# Patient Record
Sex: FEMALE
[De-identification: names, ages, dates, MRNs, and addresses within clinical notes are randomized per-mention and may not be internally consistent; named-entity substitution may affect disease eponyms.]

---

## 2019-06-24 PROBLEM — Z00.00 ENCOUNTER FOR PREVENTIVE HEALTH EXAMINATION: Status: ACTIVE | Noted: 2019-06-24

## 2019-07-10 ENCOUNTER — APPOINTMENT (OUTPATIENT)
Dept: OTOLARYNGOLOGY | Facility: CLINIC | Age: 84
End: 2019-07-10
Payer: MEDICARE

## 2019-07-10 VITALS
DIASTOLIC BLOOD PRESSURE: 82 MMHG | HEIGHT: 59 IN | WEIGHT: 80 LBS | SYSTOLIC BLOOD PRESSURE: 157 MMHG | HEART RATE: 72 BPM | BODY MASS INDEX: 16.13 KG/M2

## 2019-07-10 DIAGNOSIS — Z87.39 PERSONAL HISTORY OF OTHER DISEASES OF THE MUSCULOSKELETAL SYSTEM AND CONNECTIVE TISSUE: ICD-10-CM

## 2019-07-10 DIAGNOSIS — Z86.79 PERSONAL HISTORY OF OTHER DISEASES OF THE CIRCULATORY SYSTEM: ICD-10-CM

## 2019-07-10 DIAGNOSIS — Z82.49 FAMILY HISTORY OF ISCHEMIC HEART DISEASE AND OTHER DISEASES OF THE CIRCULATORY SYSTEM: ICD-10-CM

## 2019-07-10 DIAGNOSIS — Z82.3 FAMILY HISTORY OF STROKE: ICD-10-CM

## 2019-07-10 PROCEDURE — 92557 COMPREHENSIVE HEARING TEST: CPT

## 2019-07-10 PROCEDURE — 92567 TYMPANOMETRY: CPT

## 2019-07-10 PROCEDURE — 99213 OFFICE O/P EST LOW 20 MIN: CPT | Mod: 25

## 2019-07-10 PROCEDURE — 31231 NASAL ENDOSCOPY DX: CPT

## 2019-07-10 NOTE — REVIEW OF SYSTEMS
[Post Nasal Drip] : post nasal drip [Hearing Loss] : hearing loss [Nasal Congestion] : nasal congestion [Feeling Tired] : feeling tired [Eyesight Problems] : eyesight problems [Recent Weight Loss (___ Lbs)] : recent [unfilled] ~Ulb weight loss [Constipation] : constipation [Diarrhea] : diarrhea [Heartburn] : heartburn [Dizziness] : dizziness [Feelings Of Weakness] : feelings of weakness [Easy Bruising] : a tendency for easy bruising [Negative] : Heme/Lymph

## 2019-07-10 NOTE — ASSESSMENT
[FreeTextEntry1] : It was my impression that she has the rhinitis that is improving with Nasacort. She also has nasal valve collapse and I suggested tryingbreathe rite strips.  I indicated she would have to be careful about the tape, however.\par I felt she could continue with Nasacort and Nasonex.\par Her throat looks better although there was thick dried mucous and I recommend continuing with hydration and topical moisturizing to the nose at night.\par The hearing test was reviewed with the patient.  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  I felt that the patient would benefit from hearing aids and this was  recommended

## 2019-07-10 NOTE — CONSULT LETTER
[FreeTextEntry2] : LM MERINO\par  [FreeTextEntry1] : \par \par Dear  Dr. LM MERINO,\par \par I had the pleasure of seeing your patient today.  \par Please see my note below.\par \par \par Thank you very much for allowing me to participate in the care of your patient.\par \par Sincerely,\par \par \par Russel Cavazos\par \par \par Evan Cavazos MD\par [facility]\par [facility]\par  [facility]\par \par

## 2019-07-10 NOTE — REASON FOR VISIT
[Subsequent Evaluation] : a subsequent evaluation for [FreeTextEntry2] : Nasal Congestion and throat irritation

## 2019-07-10 NOTE — HISTORY OF PRESENT ILLNESS
[de-identified] : LOIS RANDALL was seen on July 10. I had last seen her year ago. Since I had seen her she had suffered a fall with fractures. She developed an upper respiratory tract infection while in rehabilitation and went home on June 5. She was complaining of nasal congestion and throat discomfort. This was held with Nasacort and mucinex.  This is improved but not resolved. She has a long history of throat discomfort. She did not find improvement with gabapentin. However, using alkaline water helped her symptoms dramatically. Additionally, she believes that she needs hearing aids. Certainly her family thinks so.The patient had no other ear nose or throat complaints at this visit.

## 2019-08-12 ENCOUNTER — APPOINTMENT (OUTPATIENT)
Dept: OTOLARYNGOLOGY | Facility: CLINIC | Age: 84
End: 2019-08-12
Payer: MEDICARE

## 2019-08-12 PROCEDURE — V5010 ASSESSMENT FOR HEARING AID: CPT | Mod: NC

## 2019-08-16 ENCOUNTER — APPOINTMENT (OUTPATIENT)
Dept: OTOLARYNGOLOGY | Facility: CLINIC | Age: 84
End: 2019-08-16

## 2019-09-27 ENCOUNTER — APPOINTMENT (OUTPATIENT)
Dept: OTOLARYNGOLOGY | Facility: CLINIC | Age: 84
End: 2019-09-27
Payer: MEDICARE

## 2019-09-27 VITALS
DIASTOLIC BLOOD PRESSURE: 72 MMHG | BODY MASS INDEX: 16.13 KG/M2 | HEIGHT: 59 IN | SYSTOLIC BLOOD PRESSURE: 127 MMHG | WEIGHT: 80 LBS | HEART RATE: 80 BPM

## 2019-09-27 PROCEDURE — 31231 NASAL ENDOSCOPY DX: CPT

## 2019-09-27 PROCEDURE — 69210 REMOVE IMPACTED EAR WAX UNI: CPT

## 2019-09-27 PROCEDURE — 99213 OFFICE O/P EST LOW 20 MIN: CPT | Mod: 25

## 2019-09-27 RX ORDER — AMLODIPINE BESYLATE 5 MG/1
TABLET ORAL
Refills: 0 | Status: ACTIVE | COMMUNITY

## 2019-09-27 RX ORDER — CLONAZEPAM 2 MG/1
TABLET ORAL
Refills: 0 | Status: ACTIVE | COMMUNITY

## 2019-09-27 RX ORDER — FEXOFENADINE HYDROCHLORIDE 180 MG/1
TABLET ORAL
Refills: 0 | Status: ACTIVE | COMMUNITY

## 2019-09-27 RX ORDER — ATENOLOL 50 MG/1
TABLET ORAL
Refills: 0 | Status: ACTIVE | COMMUNITY

## 2019-09-27 NOTE — PHYSICAL EXAM
[FreeTextEntry1] : \par The patient was alert and oriented and in no distress.\par Voice was clear.\par She was using a walker and comes in with an aid.\par \par Face:\par The patient had no facial asymmetry or mass.\par The skin was unremarkable.\par \par Eyes:\par The pupils were equal round and reactive to light and accommodation.\par There was no significant nystagmus or disconjugate gaze noted.\par \par Nose: \par The external nose had no significant deformity.  There was no facial tenderness.  On anterior rhinoscopy, the nasal mucosa was clear.  The anterior septum was midline.  There were no visualized polyps purulence  or masses.\par \par Oral cavity:\par The oral mucosa was normal.\par The oral and base of tongue were clear and without mass.\par The gingival and buccal mucosa were moist and without lesions.\par The palate moved well.\par There was no cleft to the palate.\par There appeared to be good salivary flow.  \par There was no pus, erythema or mass in the oral cavity.   Ears:\par  Procedure note:\par  Removal of Cerumen Impactions, both ears:  75012-00\par Both external ears were normal.\par There were symptomatic cerumen impactions in both ears.  These were cleared microscopically without trauma using both suction and curettes.  After clearing,  both ear canals were clear both eardrums were intact and mobile.  \par The tympanic membranes were intact and normal.\par \par Neck: \par The neck was symmetrical.\par The parotid and submandibular glands were normal without masses.\par The trachea was midline and there was no unusual crepitus.\par The thyroid was smooth and nontender and no masses were palpated.\par There was no significant cervical adenopathy.\par \par \par Neuro:\par Neurologically, the patient was awake, alert, and oriented to person, place and time. There were no obvious focal neurologic abnormalities.  Cranial nerves II through XII were grossly intact.\par \par \par TMJ:\par The temporomandibular joints were nontender.\par There was no abnormal crepitus and no significant malocclusion\par  [de-identified] : Nasal endoscopy: \par \par Procedure Note:\par \par Nasal endoscopy was done with topical anesthesia and a fiberoptic endoscope.\par Indication: Nasal congestion, rule out sinusitis.\par Procedure: The nasal cavity was anesthetized with topical Afrin and Pontocaine. An  endoscope was used and inserted into the nasal cavity. \par Endocoscopy was performed to inspect the interior of the nasal cavity, the nasal septum,  the middle and superior meati, the inferior, middle and superior turbinates, and the spheno-ethmoidal  recesses, the nasopharynx and eustachian tube orifices bilaterally\par  This showed that the nasal mucosa was slightly boggy.  There was a moderate S-shaped septal deflection.  However, the middle meati were open.  There were no polyps, purulence or masses.  The eustachian tube orifices were clear.  The nasopharynx was benign and without mass.  The inferior and middle turbinates were slightly boggy, the superior meati were normal and the sphenoethmoidal recesses were without evidence of disease.\par The nasal mucosa was somewhat beefy with a moderate right septal deflection without polyps, purulence or masses.\par She had nasal valve stenosis but did not have much improvement in her airway with Nayan maneuver\par \par \par

## 2019-09-27 NOTE — HISTORY OF PRESENT ILLNESS
[de-identified] :  LOIS RANDALL Was seen in followup on September 27. I had last seen her in July .  She complains of facial pressure postnasal drip of thick phlegm when she is recumbent- for a month. I had last seen her in July she was complaining of nasal congestion and throat discomfort that reponded toNasacort and mucinex. This was improved but not resolved and now this does not seem to be helping.. She has a long history of throat discomfort. She did not find improvement with gabapentin. Previously, using alkaline water helped her symptoms dramatically.\par She has a persistent disequilibrium and is going to [place] for vestibular rehabilitation\par She has the hearing losses but has not yet gotten her hearing aids.\par The patient had no other ear nose or throat complaints at this visit.\par The patient had no other ear nose or throat complaints at this visit.\par

## 2019-09-27 NOTE — ASSESSMENT
[FreeTextEntry1] : It was my impression that she has the rhinitis that is improving with Nasacort. \par However, she still complains of thick mucus and nasal congestion when recumbent.\par She has elevated the head of the bed\par She does have some nasal valve collapse but she was not helped with breech right strips\par \par Her throat looks better although there was thick dried mucous and I recommend continuing with hydration and topical moisturizing to the nose at night.  I suggested to 1200 mg of guaifenesin at bedtime and using 2 sprays of the Nasacort in each nostril at bedtime.\par I suggested continuing with her vestibular therapy, which she had planned to stop\par She had the cerumen impactions were cleared and I recommended topical moisturizing\par I suggested going ahead for her hearing aid evaluation\par I recommended a humidifier now for the bedroom at night and would like to see her back in followup in 2 months or earlier if not improving\par she finds her throat symptoms are finally much improved with alkaline water

## 2019-09-27 NOTE — REVIEW OF SYSTEMS
[Vertigo] : vertigo [Negative] : Heme/Lymph [Post Nasal Drip] : post nasal drip [Hearing Loss] : hearing loss [Nasal Congestion] : nasal congestion [Feeling Tired] : feeling tired [Recent Weight Loss (___ Lbs)] : recent [unfilled] ~Ulb weight loss [Eyesight Problems] : eyesight problems [Constipation] : constipation [Diarrhea] : diarrhea [Heartburn] : heartburn [Dizziness] : dizziness [Feelings Of Weakness] : feelings of weakness [Easy Bruising] : a tendency for easy bruising

## 2019-09-27 NOTE — CONSULT LETTER
[FreeTextEntry2] : LM MERINO\par  [FreeTextEntry1] : \par \par Dear  Dr. LM MERINO,\par \par I had the pleasure of seeing your patient today.  \par Please see my note below.\par \par \par Thank you very much for allowing me to participate in the care of your patient.\par \par Sincerely,\par \par \par Russel Cavazos\par \par \par Evan Cavazos MD\par [facility]\par [facility]\par  [facility]\par \par

## 2019-11-20 ENCOUNTER — APPOINTMENT (OUTPATIENT)
Dept: OTOLARYNGOLOGY | Facility: CLINIC | Age: 84
End: 2019-11-20

## 2019-12-09 ENCOUNTER — APPOINTMENT (OUTPATIENT)
Dept: OTOLARYNGOLOGY | Facility: CLINIC | Age: 84
End: 2019-12-09
Payer: MEDICARE

## 2019-12-09 VITALS
BODY MASS INDEX: 16.13 KG/M2 | WEIGHT: 80 LBS | DIASTOLIC BLOOD PRESSURE: 85 MMHG | HEART RATE: 85 BPM | SYSTOLIC BLOOD PRESSURE: 131 MMHG | HEIGHT: 59 IN

## 2019-12-09 PROCEDURE — 31231 NASAL ENDOSCOPY DX: CPT

## 2019-12-09 PROCEDURE — 99213 OFFICE O/P EST LOW 20 MIN: CPT | Mod: 25

## 2019-12-09 RX ORDER — IPRATROPIUM BROMIDE 21 UG/1
0.03 SPRAY NASAL 3 TIMES DAILY
Qty: 1 | Refills: 5 | Status: ACTIVE | COMMUNITY
Start: 2019-12-09 | End: 1900-01-01

## 2019-12-09 NOTE — ASSESSMENT
[FreeTextEntry1] : It was my impression that she has the complaints of a low-grade fever for 2 weeks. I did not see an etiology for this and suggested following up with her internist. She has a chronic rhinopharyngitis and increased postnasal drip at night. She probably has some vasomotor component and I suggested trial of Atrovent at night only.\par I suggested following up for her hearing aides as well. Vestibular therapy has helped her imbalance.\par I did take a throat culture and would treat if so indicated.

## 2019-12-09 NOTE — CONSULT LETTER
[FreeTextEntry2] : LM MERINO\par  [FreeTextEntry1] : \par \par Dear  Dr. LM MERINO,\par \par I had the pleasure of seeing your patient today.  \par Please see my note below.\par \par \par Thank you very much for allowing me to participate in the care of your patient.\par \par Sincerely,\par \par \par Russel Cavazos\par \par \par Evan Cavazos MD\par [facility]\par [facility]\par  [facility]\par \par  [FreeTextEntry3] : Russel\par \par Evan Cavazos MD\par [facility]\par [facility]\par  [facility]\par \par

## 2019-12-09 NOTE — HISTORY OF PRESENT ILLNESS
[de-identified] : LOIS RANDALL is a 88 year old female who comes in complaining of Running a low-grade fever for about 2 weeks. She says it isabout 100.5 and comes down to 99 5 with Tylenol. She was thought to have a year a urinary tract infection but apparently her culture was negative.She does not feel acutely ill and has no new complaints other than feeling under the weather.  She is complaining of an increased post nasal drip at night and has had some bloody discharge at times from the right nasal cavity.\par I had seen her multiple times for the nasal congestion and throat discomfort.She did not have much improvement with gabapentin but alkaline water helped her throat. Her disequilibrium is better after going for vestibular rehabilitation and she has a hearing losses and has still not gotten her aids yet.The patient had no other ear nose or throat complaints at this visit.

## 2019-12-09 NOTE — PHYSICAL EXAM
[FreeTextEntry1] : General:\par The patient was alert and oriented and in no distress.\par Voice was clear. She looks as if she did not feel well\par she was using a walker for ambulation\par Ears:\par The external ears were normal without deformity.\par The ear canals were clear.\par The tympanic membranes were intact and normal.\par \par Oral cavity:\par The oral mucosa was normal.\par The oral and base of tongue were clear and without mass.\par The gingival and buccal mucosa were moist and without lesions.\par The palate moved well.\par There was no cleft to the palate.\par There appeared to be good salivary flow.  \par There was no pus, erythema or mass in the oral cavity.\par \par Neck: \par The neck was symmetrical.\par The parotid and submandibular glands were normal without masses.\par The trachea was midline and there was no unusual crepitus.\par The thyroid was smooth and nontender and no masses were palpated.\par There was no significant cervical adenopathy.\par \par Neuro:\par Neurologically, the patient was awake, alert, and oriented to person, place and time. There were no obvious focal neurologic abnormalities.  Cranial nerves II through XII were grossly intact.\par  [de-identified] : Nasal endoscopy: \par CPT 88334\par Procedure Note:\par \par Nasal endoscopy was done with topical anesthesia of Pontocaine and Afrin and a      nasal endoscope.\par Indication: Nasal congestion, rule out sinusitis, fever, epistaxis.\par Procedure: The nasal cavity was anesthetized with topical Afrin and Pontocaine. An  endoscope was used and inserted into the nasal cavity.\par Attention was first paid to the anterior nasal cavity.\par Endocoscopy was performed to inspect the interior of the nasal cavity, the nasal septum,  the middle and superior meati, the inferior, middle and superior turbinates, and the spheno-ethmoidal  recesses, the nasopharynx and eustachian tube orifices bilaterally. \par All findings were normal except:\par She has the nasal valve insufficiency and flap was re re nasal mucosa with a healing eschar on the right mid septum without polyps, purulence or masses. The middle meati were patent and the nasopharynx was benign.\par \par Flexible fiberoptic laryngoscopy: CPT 56466\par Indications: Dysphagia\par Procedure note:\par \par Flexible fiberoptic laryngoscopy was performed because of dysphagia and because of the patient's inability to tolerate adequate mirror examination.\par \par The nasal cavity was anesthetized with Pontocaine and Afrin.\par The flexible endoscope was placed into the patient's nasal cavity.\par The nasopharynx was without masses.\par The oropharynx, vallecula and base of tongue had no masses.\par The epiglottis, aryepiglottic folds and false vocal cords were normal.\par The pyriform sinuses were without mucosal lesions or pooling of secretions.  \par The laryngeal ventricles were without lesions.\par The visualized subglottis was without mass.\par The lateral and posterior pharyngeal walls were clear and symmetrical.\par The vocal folds were clear and mobile; they abducted and adducted normally.\par There was no interarytenoid mass or fullness.\par She had some pooling of secretions postcricoid\par

## 2019-12-12 LAB — BACTERIA THROAT CULT: NORMAL

## 2019-12-19 ENCOUNTER — APPOINTMENT (OUTPATIENT)
Dept: OTOLARYNGOLOGY | Facility: CLINIC | Age: 84
End: 2019-12-19

## 2019-12-27 ENCOUNTER — APPOINTMENT (OUTPATIENT)
Dept: OTOLARYNGOLOGY | Facility: CLINIC | Age: 84
End: 2019-12-27
Payer: MEDICARE

## 2019-12-27 VITALS
DIASTOLIC BLOOD PRESSURE: 79 MMHG | BODY MASS INDEX: 16.13 KG/M2 | HEART RATE: 75 BPM | SYSTOLIC BLOOD PRESSURE: 127 MMHG | WEIGHT: 80 LBS | HEIGHT: 59 IN

## 2019-12-27 PROCEDURE — 99213 OFFICE O/P EST LOW 20 MIN: CPT | Mod: 25

## 2019-12-27 PROCEDURE — 31231 NASAL ENDOSCOPY DX: CPT

## 2019-12-27 NOTE — HISTORY OF PRESENT ILLNESS
[de-identified] : LOIS RANDALL is a 88 year woman with a history of recent low grade fever and chills. She has some facial discomfort. Her nose is stuffy and she has some bleeding.She is not seeing much discharge.

## 2019-12-27 NOTE — REVIEW OF SYSTEMS
[Nasal Congestion] : nasal congestion [Throat Pain] : throat pain [Patient Intake Form Reviewed] : Patient intake form was reviewed

## 2019-12-27 NOTE — ASSESSMENT
[FreeTextEntry1] : LOIS RANDALL malaise, low grade fever and chills. I will send her for sinus CT to rule out sinus infection which is unlikely to cause her symptoms.

## 2019-12-27 NOTE — CONSULT LETTER
[Dear  ___] : Dear  [unfilled], [Please see my note below.] : Please see my note below. [Consult Letter:] : I had the pleasure of evaluating your patient, [unfilled]. [Consult Closing:] : Thank you very much for allowing me to participate in the care of this patient.  If you have any questions, please do not hesitate to contact me. [Sincerely,] : Sincerely, [FreeTextEntry3] : Shahzad Deras MD\par

## 2020-01-30 ENCOUNTER — APPOINTMENT (OUTPATIENT)
Dept: OTOLARYNGOLOGY | Facility: CLINIC | Age: 85
End: 2020-01-30

## 2020-03-02 ENCOUNTER — APPOINTMENT (OUTPATIENT)
Dept: OTOLARYNGOLOGY | Facility: CLINIC | Age: 85
End: 2020-03-02
Payer: MEDICARE

## 2020-03-02 PROCEDURE — 99214 OFFICE O/P EST MOD 30 MIN: CPT | Mod: 25

## 2020-03-02 PROCEDURE — 31231 NASAL ENDOSCOPY DX: CPT

## 2020-03-02 NOTE — HISTORY OF PRESENT ILLNESS
[de-identified] : LOIS RANDALL Was seen on March 2. She had seen Dr. Deras in late December and had CT imaging which showed minimal mucoperiosteal changes. She comes in now complaining of a persistent feeling of mucous low in her throat. She points to her mid chest. She rarely can bring up any mucus. This is in spite of using guaifenesin twice a day. She also complains of obstruction in the nasal cavity from bloody crusting. She is no longer felt having fevers.The patient had no other ear nose or throat complaints at this visit.

## 2020-03-02 NOTE — PHYSICAL EXAM
[FreeTextEntry1] : \par The patient was alert and oriented and in no distress.\par Voice was clear.\par \par Face:\par The patient had no facial asymmetry or mass.\par The skin was unremarkable.\par \par Eyes:\par The pupils were equal round and reactive to light and accommodation.\par There was no significant nystagmus or disconjugate gaze noted.\par \par Nose: \par The external nose had no significant deformity.  There was no facial tenderness.  On anterior rhinoscopy, the nasal mucosa was clear.  The anterior septum was midline.  There were no visualized polyps purulence  or masses.\par \par Oral cavity:\par The oral mucosa was normal.\par The oral and base of tongue were clear and without mass.\par The gingival and buccal mucosa were moist and without lesions.\par The palate moved well.\par There was no cleft to the palate.\par There appeared to be good salivary flow.  \par There was no pus, erythema or mass in the oral cavity.\par \par \par Ears:\par The external ears were normal without deformity.\par The ear canals were clear.\par The tympanic membranes were intact and normal.\par \par Neck: \par The neck was symmetrical.\par The parotid and submandibular glands were normal without masses.\par The trachea was midline and there was no unusual crepitus.\par The thyroid was smooth and nontender and no masses were palpated.\par There was no significant cervical adenopathy.\par \par \par Neuro:\par Neurologically, the patient was awake, alert, and oriented to person, place and time. There were no obvious focal neurologic abnormalities.  Cranial nerves II through XII were grossly intact.\par \par \par TMJ:\par The temporomandibular joints were nontender.\par There was no abnormal crepitus and no significant malocclusion\par \par  [de-identified] : Nasal endoscopy: \par \par Procedure Note:\par \par Nasal endoscopy was done with topical anesthesia and a fiberoptic endoscope.\par Indication: Nasal congestion, rule out sinusitis.\par Procedure: The nasal cavity was anesthetized with topical Afrin and Pontocaine. An  endoscope was used and inserted into the nasal cavity. \par Endocoscopy was performed to inspect the interior of the nasal cavity, the nasal septum,  the middle and superior meati, the inferior, middle and superior turbinates, and the spheno-ethmoidal  recesses, the nasopharynx and eustachian tube orifices bilaterally\par  This showed that the nasal mucosa was slightly boggy.  There was a moderate S-shaped septal deflection.  However, the middle meati were open.  There were no polyps, purulence or masses.  The eustachian tube orifices were clear.  The nasopharynx was benign and without mass.  The inferior and middle turbinates were slightly boggy, the superior meati were normal and the sphenoethmoidal recesses were without evidence of disease.\par She had dry nasal mucosa with eschars on the septum bilaterally.\par Flexible fiberoptic laryngoscopy: [facility] [id number]\par Indications: Dysphagia\par Procedure note:\par  \par Flexible fiberoptic laryngoscopy was performed because of dysphagia and the patient's inability to tolerate adequate mirror examination.\par The nasal cavity was anesthetized with Pontocaine plus Afrin.\par \par \par The nasal cavity was anesthetized with Pontocaine and Afrin.\par The flexible endoscope was placed into the patient's nasal cavity.\par The nasopharynx was without masses.\par The oropharynx, vallecula and base of tongue had no masses.\par The epiglottis, aryepiglottic folds and false vocal cords were normal.\par The pyriform sinuses were without mucosal lesions or pooling of secretions.  \par The laryngeal ventricles were without lesions.\par The visualized subglottis was without mass.\par The lateral and posterior pharyngeal walls were clear and symmetrical.\par The vocal folds were clear and mobile; they abducted and adducted normally.\par There was no interarytenoid mass or fullness.\par There was significant posterior laryngeal inflammation consistent with reflux.

## 2020-03-02 NOTE — CONSULT LETTER
[FreeTextEntry2] : LM MERINO\par  [FreeTextEntry1] : \par \par Dear  Dr. LM MERINO,\par \par I had the pleasure of seeing your patient today.  \par Please see my note below.\par \par \par Thank you very much for allowing me to participate in the care of your patient.\par \par Sincerely,\par \par \par Russel Cavazos\par \par \par Evan Cavazos MD\par [facility]\par [facility]\par  [facility]\par \par  [FreeTextEntry3] : Russel\par \par Evan Cavazos MD\par [facility]\par [facility]\par  [facility]\par \par

## 2020-03-02 NOTE — REASON FOR VISIT
[Subsequent Evaluation] : a subsequent evaluation for [Sinusitis] : sinusitis [FreeTextEntry2] : CT Review

## 2020-03-02 NOTE — ASSESSMENT
[FreeTextEntry1] : It was my impression that she has the feeling of mucus in her chest it wakes her up at night. She had sinus imaging which showed minimal mucoperiosteal changes and I did not see any significant pathology in her nose. \par It was my impression that the patient had recurrence nasal bloody crust in. There was no active bleeding and no obvious points to cauterize at this time.\par There is a septal deflection and the bleeding was likely  from the deflected point.  In any case I reviewed the pathogenesis.  I suggested topical moisturizing and using a humidifier but she could not take Afrin..  I explained that it will probably take about 2 weeks to heal completely and would like to see the patient should  the bleeding recur.\par I recommended further medical evaluation for her symptoms of mucus in her chest and suggested Mucinex 1200 mg twice a day.\par I would consider treating with an antibiotic for the sinus findings but they're really are quite minimal.\par She no longer is febrile.\par More than 30 minutes was spent with the patient reviewing options, medical records and counseling about care, face to face.\par

## 2020-04-23 ENCOUNTER — APPOINTMENT (OUTPATIENT)
Dept: OTOLARYNGOLOGY | Facility: AMBULATORY SURGERY CENTER | Age: 85
End: 2020-04-23

## 2020-06-03 ENCOUNTER — APPOINTMENT (OUTPATIENT)
Dept: OTOLARYNGOLOGY | Facility: CLINIC | Age: 85
End: 2020-06-03

## 2020-06-25 ENCOUNTER — APPOINTMENT (OUTPATIENT)
Dept: OTOLARYNGOLOGY | Facility: CLINIC | Age: 85
End: 2020-06-25

## 2020-09-04 ENCOUNTER — APPOINTMENT (OUTPATIENT)
Dept: OTOLARYNGOLOGY | Facility: CLINIC | Age: 85
End: 2020-09-04
Payer: MEDICARE

## 2020-09-04 ENCOUNTER — APPOINTMENT (OUTPATIENT)
Dept: OTOLARYNGOLOGY | Facility: CLINIC | Age: 85
End: 2020-09-04
Payer: SELF-PAY

## 2020-09-04 VITALS — WEIGHT: 80 LBS | HEIGHT: 59 IN | TEMPERATURE: 97.9 F | BODY MASS INDEX: 16.13 KG/M2

## 2020-09-04 DIAGNOSIS — Z71.89 OTHER SPECIFIED COUNSELING: ICD-10-CM

## 2020-09-04 DIAGNOSIS — H93.299 OTHER ABNORMAL AUDITORY PERCEPTIONS, UNSPECIFIED EAR: ICD-10-CM

## 2020-09-04 PROCEDURE — G0268 REMOVAL OF IMPACTED WAX MD: CPT

## 2020-09-04 PROCEDURE — 92557 COMPREHENSIVE HEARING TEST: CPT

## 2020-09-04 PROCEDURE — 31231 NASAL ENDOSCOPY DX: CPT

## 2020-09-04 PROCEDURE — 99214 OFFICE O/P EST MOD 30 MIN: CPT | Mod: 25

## 2020-09-04 PROCEDURE — 92550 TYMPANOMETRY & REFLEX THRESH: CPT

## 2020-09-04 PROCEDURE — V5010 ASSESSMENT FOR HEARING AID: CPT | Mod: NC

## 2020-09-04 RX ORDER — FAMOTIDINE 20 MG/1
20 TABLET, FILM COATED ORAL
Qty: 60 | Refills: 5 | Status: ACTIVE | COMMUNITY
Start: 2020-09-04 | End: 1900-01-01

## 2020-09-11 ENCOUNTER — APPOINTMENT (OUTPATIENT)
Dept: OTOLARYNGOLOGY | Facility: CLINIC | Age: 85
End: 2020-09-11
Payer: SELF-PAY

## 2020-09-11 PROCEDURE — V5261I: CUSTOM

## 2020-09-11 PROCEDURE — V5267J: CUSTOM | Mod: NC

## 2020-09-21 ENCOUNTER — APPOINTMENT (OUTPATIENT)
Dept: OTOLARYNGOLOGY | Facility: CLINIC | Age: 85
End: 2020-09-21
Payer: SELF-PAY

## 2020-09-21 PROCEDURE — 92593: CPT | Mod: NC

## 2020-10-01 ENCOUNTER — APPOINTMENT (OUTPATIENT)
Dept: OTOLARYNGOLOGY | Facility: CLINIC | Age: 85
End: 2020-10-01

## 2021-03-17 ENCOUNTER — APPOINTMENT (OUTPATIENT)
Dept: OTOLARYNGOLOGY | Facility: CLINIC | Age: 86
End: 2021-03-17
Payer: MEDICARE

## 2021-03-17 VITALS — HEIGHT: 59 IN | WEIGHT: 80 LBS | TEMPERATURE: 97.1 F | BODY MASS INDEX: 16.13 KG/M2

## 2021-03-17 PROCEDURE — 69210 REMOVE IMPACTED EAR WAX UNI: CPT

## 2021-03-17 PROCEDURE — 31575 DIAGNOSTIC LARYNGOSCOPY: CPT

## 2021-03-17 PROCEDURE — 99214 OFFICE O/P EST MOD 30 MIN: CPT | Mod: 25

## 2021-03-17 NOTE — HISTORY OF PRESENT ILLNESS
[de-identified] : LOIS RANDALL Was seen in followup on March 17. She notes that her heartburn, sore throat and hoarseness or worse. She has been taking Pepcid and she has an appointment later this week with Dr. Hayward.\par Since I had seen her, she got her hearing aids and she finds that the help her quite a bit.The patient had no other ear nose or throat complaints at this visit.

## 2021-03-17 NOTE — ASSESSMENT
[FreeTextEntry1] : It was my impression that she has a sensorineural hearing losses and is doing quite well with her hearing aids. She finds they are helping her and is quite happy with them.  She has yet to get vaccinated and wants to wait until after her second vaccine to go back to the audiologist to have her hearing aids adjusted if needed\par She had a cerumen impaction in the right ear that was cleared microscopically without trauma.\par She has some hoarseness and increased heartburn and laryngeal evidence of reflux on her exam. I could not see how much of her hoarseness is from her posterior inflammation and how much from presbyphonia.\par In any case, I did not change her regimen as she has an appointment later this week with Dr. Hayward.\par I has to to call to let me know how she is doing and otherwise would like to reevaluate in 6 months or earlier if needed\par \par More than 30  minutes was spent on the patient's care today including review of their chart, and the  history, visit, physical exam,  evaluation of possible diagnoses,  discussion with the patient, ordering evaluations and prescriptions and charting.

## 2021-03-17 NOTE — CONSULT LETTER
[FreeTextEntry2] : LM MERINO\par  [FreeTextEntry1] : \par \par Dear  Dr. LM MERINO,\par \par I had the pleasure of seeing your patient today.  \par Please see my note below.\par \par \par Thank you very much for allowing me to participate in the care of your patient.\par \par Sincerely,\par \par \par Russel Cavazos\par \par \par Evan Cavazos MD\par [facility]\par [facility]\par  [facility]\par \par  [FreeTextEntry3] : Russel\par \par Evan Cavazos MD\par [facility]\par [facility]\par  [facility]\par \par

## 2021-03-17 NOTE — PHYSICAL EXAM
[FreeTextEntry1] : General:\par The patient was alert and oriented and in no distress.\par Voice was clear.\par She looked more frail and was using a walker for ambulation and had moderately breathy dysphonia\par \par Ears:\par Procedure Note\par Removal of Cerumen- right ear   cpt 49299\par Dx:  Symptomatic cerument impaction- right ear\par Both external ears were normal.\par There was a dense cerumen impaction in the right ear.  This was cleared microscopically using suction and curettes without trauma.  Beyond that, both ear canals were clear both eardrums were intact and mobile.\par She was using binaural hearing aids\par \par Oral cavity:\par The oral mucosa was normal.\par The oral and base of tongue were clear and without mass.\par The gingival and buccal mucosa were moist and without lesions.\par The palate moved well.\par There was no cleft to the palate.\par There appeared to be good salivary flow.  \par There was no pus, erythema or mass in the oral cavity.\par \par Neck: \par The neck was symmetrical.\par The parotid and submandibular glands were normal without masses.\par The trachea was midline and there was no unusual crepitus.\par The thyroid was smooth and nontender and no masses were palpated.\par There was no significant cervical adenopathy.\par \par Face:\par The patient had no facial asymmetry or mass.\par The skin was unremarkable.\par \par Nose: \par The external nose had no significant deformity.  There was no facial tenderness.  On anterior rhinoscopy, the nasal mucosa was clear.  The anterior septum was midline.  There were no visualized polyps purulence  or masses.\par The nasal mucosa was tried with areas of punctate eschar\par \par Flexible fiberoptic laryngoscopy: CPT 61075\par Indications: Dysphagia\par Procedure note:\par \par Flexible fiberoptic laryngoscopy was performed because of dysphagia and because of the patient's inability to tolerate adequate mirror examination.\par \par The nasal cavity was anesthetized with Pontocaine and Afrin.\par The flexible endoscope was placed into the patient's nasal cavity.\par The nasopharynx was without masses.\par The oropharynx, vallecula and base of tongue had no masses.\par The epiglottis, aryepiglottic folds and false vocal cords were normal.\par The pyriform sinuses were without mucosal lesions or pooling of secretions.  \par The laryngeal ventricles were without lesions.\par The visualized subglottis was without mass.\par The lateral and posterior pharyngeal walls were clear and symmetrical.\par The vocal folds were clear and mobile; they abducted and adducted normally.\par There was no interarytenoid mass or fullness.\par \par Endoscopy was done with Covid precautions and with video. All risks and benefits were discussed with the patient and consent obtained.\par \par She has marked arytenoid and interarytenoid inflammation and some loss of the bulk of the true vocal folds without nodules polyps or masses

## 2021-07-09 ENCOUNTER — APPOINTMENT (OUTPATIENT)
Dept: OTOLARYNGOLOGY | Facility: CLINIC | Age: 86
End: 2021-07-09
Payer: MEDICARE

## 2021-07-09 VITALS — HEIGHT: 59 IN | WEIGHT: 80 LBS | BODY MASS INDEX: 16.13 KG/M2 | TEMPERATURE: 97.8 F

## 2021-07-09 DIAGNOSIS — R49.8 OTHER VOICE AND RESONANCE DISORDERS: ICD-10-CM

## 2021-07-09 PROCEDURE — 69210 REMOVE IMPACTED EAR WAX UNI: CPT

## 2021-07-09 PROCEDURE — 31231 NASAL ENDOSCOPY DX: CPT

## 2021-07-09 PROCEDURE — 99214 OFFICE O/P EST MOD 30 MIN: CPT | Mod: 25

## 2021-07-09 RX ORDER — TRIAMCINOLONE ACETONIDE 55 UL/1
SPRAY, METERED NASAL
Refills: 0 | Status: DISCONTINUED | COMMUNITY
End: 2021-07-09

## 2021-07-09 NOTE — REASON FOR VISIT
[Subsequent Evaluation] : a subsequent evaluation for [FreeTextEntry2] : difficulty breathing, nasal congestion

## 2021-07-09 NOTE — HISTORY OF PRESENT ILLNESS
[de-identified] : LOIS RANDALL Was seen in followup on July 9.  She has been doing relatively well.  She notes that she had facial swelling or so stopped Nasacort and it seems to have improved. However she is complaining of nasal congestion especially on the right and especially at night.\par She has had problems with her balance and walking ever since an episode of vertigo in the past. She had been going to vestibular therapy but stopped during the pandemic.  She notes that her voice is still gets worse, especially at night.\par She is concerned about a dermatitis over her chin..  \par Lastly, she got hearing aids and she finds they are really helping her quite a bit.\par The patient had no other ear nose or throat complaints at this visit.

## 2021-07-09 NOTE — CONSULT LETTER
[FreeTextEntry2] : LM MERINO\par  [FreeTextEntry1] : \par \par Dear  Dr. LM MERINO,\par \par I had the pleasure of seeing your patient today.  \par Please see my note below.\par \par \par Thank you very much for allowing me to participate in the care of your patient.\par \par Sincerely,\par \par \par Russel Cavazos\par \par \par Evan Cavazos MD\par [facility]\par [facility]\par  [facility]\par \par  [FreeTextEntry3] : Russel\par \par Evan Cavazos MD\par [facility]\par [facility]\par  [facility]\par \par

## 2021-07-09 NOTE — PHYSICAL EXAM
[FreeTextEntry1] : General:\par The patient was alert and oriented and in no distress.\par Voice was clear.\par She remains frail, but looks much better.\par She was using a walker for ambulation\par She is using binaural amplification\par \par Ears:\par  Procedure note:\par  Removal of Cerumen Impactions, both ears:  08601-78\par Both external ears were normal.\par There were symptomatic cerumen impactions in both ears.  These were cleared microscopically without trauma using both suction and curettes.  After clearing,  both ear canals were clear both eardrums were intact and mobile.  \par \par Face:\par The patient had no facial asymmetry or mass.\par The skin was unremarkable.\par She had a some irregularity of the skin over the skin without obvious mass or inflammation\par \par Nose: \par The external nose had no significant deformity.  There was no facial tenderness.  On anterior rhinoscopy, the nasal mucosa was clear.  The anterior septum was midline.  There were no visualized polyps purulence  or masses.\par \par Nasal endoscopy: \par CPT 32101\par Procedure Note:\par \par Endoscopy was done with Covid precautions and with video. All risks and benefits were discussed with the patient and consent obtained.\par \par Nasal endoscopy was done with topical anesthesia of Pontocaine and Afrin and a      nasal endoscope.\par Indication: Nasal congestion, rule out sinusitis.\par Procedure: The nasal cavity was anesthetized with topical Afrin and Pontocaine. An  endoscope was used and inserted into the nasal cavity.\par Attention was first paid to the anterior nasal cavity.\par Endocoscopy was performed to inspect the interior of the nasal cavity, the nasal septum,  the middle and superior meati, the inferior, middle and superior turbinates, and the spheno-ethmoidal  recesses, the nasopharynx and eustachian tube orifices bilaterally. \par All findings were normal except:\par The nasal mucosa is slightly boggy with a sharp right septal deflection coming back to the left but no polyps, purulence or masses.\par \par Flexible fiberoptic laryngoscopy: CPT 62886\par Indications: Dysphagia\par Procedure note:\par \par Flexible fiberoptic laryngoscopy was performed because of dysphagia and because of the patient's inability to tolerate adequate mirror examination.\par \par The nasal cavity was anesthetized with Pontocaine and Afrin.\par The flexible endoscope was placed into the patient's nasal cavity.\par The nasopharynx was without masses.\par The oropharynx, vallecula and base of tongue had no masses.\par The epiglottis, aryepiglottic folds and false vocal cords were normal.\par The pyriform sinuses were without mucosal lesions or pooling of secretions.  \par The laryngeal ventricles were without lesions.\par The visualized subglottis was without mass.\par The lateral and posterior pharyngeal walls were clear and symmetrical.\par The vocal folds were clear and mobile; they abducted and adducted normally.\par There was no interarytenoid mass or fullness.\par \par Endoscopy was done with Covid precautions and with video. 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

## 2021-07-09 NOTE — ASSESSMENT
[FreeTextEntry1] : It was my impression that she has a sensorineural hearing loss less and finds that her hearing aids are helping her quite a bit. She had them last evaluated in September and I recommended having her hearing aids evaluated now and as needed rather than waiting for a dysfunction.\par It was my impression that the patient had a cerumen impaction that was cleared.  I recommended topical moisturizing.  I recommended avoiding Q-tips.  I reviewed aural hygiene and the role of cerumen with the patient.  I suggested a repeat visit in a year or earlier should the need arise.\par She has an irregularity of the skin over her chin, which may just be loss of subcutaneous fat but I suggested a dermatology evaluation\par She has the rhinitis and the right sided septal deflection.\par She thought she had facial fullness from the Nasacort. It seems unlikely but not impossible and I suggested going back to Nasacort but she is using one puff each nostril and stopping if she finds that the facial fundus recurs\par She has a dysphonia endolaryngeal evidence of reflux which are much improved. She still has some chronic changes for which I did not suggest intervention.\par She had stopped vestibular therapy because of the pandemic and I gave her a referral to go back to vestibular and gait therapy again \par

## 2022-01-07 ENCOUNTER — APPOINTMENT (OUTPATIENT)
Dept: OTOLARYNGOLOGY | Facility: CLINIC | Age: 87
End: 2022-01-07

## 2022-01-24 ENCOUNTER — APPOINTMENT (OUTPATIENT)
Dept: OTOLARYNGOLOGY | Facility: CLINIC | Age: 87
End: 2022-01-24
Payer: MEDICARE

## 2022-01-24 VITALS — WEIGHT: 82 LBS | HEIGHT: 59 IN | BODY MASS INDEX: 16.53 KG/M2 | TEMPERATURE: 95.1 F

## 2022-01-24 PROCEDURE — 31231 NASAL ENDOSCOPY DX: CPT

## 2022-01-24 PROCEDURE — 69210 REMOVE IMPACTED EAR WAX UNI: CPT

## 2022-01-24 PROCEDURE — 99214 OFFICE O/P EST MOD 30 MIN: CPT | Mod: 25

## 2022-01-24 NOTE — REVIEW OF SYSTEMS
[Hearing Loss] : hearing loss [Nasal Congestion] : nasal congestion [Nose Bleeds] : nose bleeds [Throat Clearing] : throat clearing [Negative] : Heme/Lymph [de-identified] : drainage and postnasal drip  [de-identified] : difficulty breathing and reflux  [FreeTextEntry4] : neck pain, weight loss,sleep apnea, and cough

## 2022-01-24 NOTE — CONSULT LETTER
[FreeTextEntry2] : LM MERINO\par  [FreeTextEntry1] : \par \par Dear  Dr. LM MERINO,\par \par I had the pleasure of seeing your patient today.  \par Please see my note below.\par \par \par Thank you very much for allowing me to participate in the care of your patient.\par \par Sincerely,\par \par \par Evan Cavazos MD\par [facility]\par [facility]\par  [facility]\par \par

## 2022-01-24 NOTE — REASON FOR VISIT
[Subsequent Evaluation] : a subsequent evaluation for [Nasal Obstruction] : nasal obstruction [Throat Pain] : throat pain

## 2022-01-24 NOTE — HISTORY OF PRESENT ILLNESS
[de-identified] : LOIS MONSALVE Was seen on January 24. I had last seen her 6 months ago. She finds that the hearing aids are helping her quite a bit. She complains of nasal congestion and phlegm in the back of her throat. She finds that her reflux and sore throat, however, are much better since using alkaline water regularly. The patient had no other ear nose or throat complaints at this visit.

## 2022-01-24 NOTE — PHYSICAL EXAM
[FreeTextEntry1] : General:\par The patient was alert and oriented and in no distress.\par Voice was clear. She was using a walker for ambulation and look more frail\par \par Oral cavity:\par The oral mucosa was normal.\par The oral and base of tongue were clear and without mass.\par The gingival and buccal mucosa were moist and without lesions.\par The palate moved well.\par There was no cleft to the palate.\par There appeared to be good salivary flow.  \par There was no pus, erythema or mass in the oral cavity.\par \par Neck: \par The neck was symmetrical.\par The parotid and submandibular glands were normal without masses.\par The trachea was midline and there was no unusual crepitus.\par The thyroid was smooth and nontender and no masses were palpated.\par There was no significant cervical adenopathy.\par \par Neuro:\par Neurologically, the patient was awake, alert, and oriented to person, place and time. There were no obvious focal neurologic abnormalities.  Cranial nerves II through XII were grossly intact.\par \par Ears:\par  Procedure note:\par  Removal of Cerumen Impactions, both ears:  26564-54\par Both external ears were normal.\par There were symptomatic cerumen impactions in both ears.  These were cleared microscopically without trauma using both suction and curettes.  After clearing,  both ear canals were clear both eardrums were intact and mobile.  \par She had dry flaking skin of the ear canals\par \par Nasal endoscopy: \par CPT 40422\par Procedure Note:\par \par Endoscopy was done with Covid precautions and with video. All risks and benefits were discussed with the patient and consent obtained.\par \par Nasal endoscopy was done with topical anesthesia of Pontocaine and Afrin and a      nasal endoscope.\par Indication: Nasal congestion, rule out sinusitis.\par Procedure: The nasal cavity was anesthetized with topical Afrin and Pontocaine. An  endoscope was used and inserted into the nasal cavity.\par Attention was first paid to the anterior nasal cavity.\par Endocoscopy was performed to inspect the interior of the nasal cavity, the nasal septum,  the middle and superior meati, the inferior, middle and superior turbinates, and the spheno-ethmoidal  recesses, the nasopharynx and eustachian tube orifices bilaterally. 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

## 2022-01-24 NOTE — ASSESSMENT
[FreeTextEntry1] : It is my impression that the patient has an an exczematoid otitis externa.  The pathogenesis was discussed.  I suggested avoiding Q-tips.  I recommended topical moisturizing and using either Dermotic drops or other oil drops.  I suggested repeat evaluation in 6 months or earlier should the need arise. She is doing well with her hearing aids and I recommended having them adjusted as needed. I would repeat her hearing test at her next visit.\par \par She has dry thick mucus and crusting of the nasal cavities and I suggested topical moisturizing and using a humidifier for the heating season\par \par Her reflux symptoms she reports are much better since she has been using alkaline water and I recommended continuing. I looked at other brands with her to see if they could be less expensive although some many have added bicarbonate and I did not so recommend those\par \par

## 2022-03-11 ENCOUNTER — APPOINTMENT (OUTPATIENT)
Dept: OTOLARYNGOLOGY | Facility: CLINIC | Age: 87
End: 2022-03-11
Payer: SELF-PAY

## 2022-03-11 PROCEDURE — 92593: CPT | Mod: NC

## 2022-04-22 ENCOUNTER — APPOINTMENT (OUTPATIENT)
Dept: OTOLARYNGOLOGY | Facility: CLINIC | Age: 87
End: 2022-04-22
Payer: MEDICARE

## 2022-04-22 VITALS — TEMPERATURE: 98 F | HEIGHT: 59 IN | BODY MASS INDEX: 16.53 KG/M2 | WEIGHT: 82 LBS

## 2022-04-22 PROCEDURE — 99213 OFFICE O/P EST LOW 20 MIN: CPT | Mod: 25

## 2022-04-22 PROCEDURE — 69210 REMOVE IMPACTED EAR WAX UNI: CPT

## 2022-04-22 PROCEDURE — 31231 NASAL ENDOSCOPY DX: CPT

## 2022-04-22 NOTE — CONSULT LETTER
[FreeTextEntry2] : LM MERINO\par  [FreeTextEntry1] : \par \par Dear  Dr. LM MERINO,\par \par I had the pleasure of seeing your patient today.  \par Please see my note below.\par \par \par Thank you very much for allowing me to participate in the care of your patient.\par \par Sincerely,\par \par \par Russel Cavazos

## 2022-04-22 NOTE — PHYSICAL EXAM
[FreeTextEntry1] : General:\par The patient was alert and oriented and in no distress.\par Voice was clear.  She comes in with an aide from the rehab facility and in a wheelchair\par \par Face:\par The patient had no facial asymmetry or mass.\par The skin was unremarkable.\par \par Ears:\par  Procedure note:\par  Removal of Cerumen Impactions, both ears:  30230-63\par Both external ears were normal.\par There were symptomatic cerumen impactions in both ears.  These were cleared microscopically without trauma using both suction and curettes.  After clearing,  both ear canals were clear both eardrums were intact and mobile.  \par \par Oral cavity:\par The oral mucosa was normal.\par The oral and base of tongue were clear and without mass.\par The gingival and buccal mucosa were moist and without lesions.\par The palate moved well.\par There was no cleft to the palate.\par There appeared to be good salivary flow.  \par There was no pus, erythema or mass in the oral cavity.\par \par Neck: \par The neck was symmetrical.\par The parotid and submandibular glands were normal without masses.\par The trachea was midline and there was no unusual crepitus.\par The thyroid was smooth and nontender and no masses were palpated.\par There was no significant cervical adenopathy.\par \par Neuro:\par Neurologically, the patient was awake, alert, and oriented to person, place and time. There were no obvious focal neurologic abnormalities.  Cranial nerves II through XII were grossly intact.\par \par Nasal endoscopy: \par CPT 50562\par Procedure Note:\par \par Endoscopy was done with Covid precautions and with video. All risks and benefits were discussed with the patient and consent obtained.\par \par Nasal endoscopy was done with topical anesthesia of Pontocaine and Afrin and a      nasal endoscope.\par Indication: Nasal congestion, rule out sinusitis.\par Procedure: The nasal cavity was anesthetized with topical Afrin and Pontocaine. An  endoscope was used and inserted into the nasal cavity.\par Attention was first paid to the anterior nasal cavity.\par Endocoscopy was performed to inspect the interior of the nasal cavity, the nasal septum,  the middle and superior meati, the inferior, middle and superior turbinates, and the spheno-ethmoidal  recesses, the nasopharynx and eustachian tube orifices bilaterally. \par All findings were normal except:\par The nasal mucosa is boggy but dry with thick discharge that was clear but without polyps purulence or masses.\par \par Flexible fiberoptic laryngoscopy: [facility] 15962\par Indications: Dysphagia\par Procedure note:\par \par Flexible fiberoptic laryngoscopy was performed because of dysphagia and because of the patient's inability to tolerate adequate mirror examination.\par \par The nasal cavity was anesthetized with Pontocaine and Afrin.\par The flexible endoscope was placed into the patient's nasal cavity.\par The nasopharynx was without masses.\par The oropharynx, vallecula and base of tongue had no masses.\par The epiglottis, aryepiglottic folds and false vocal cords were normal.\par The pyriform sinuses were without mucosal lesions or pooling of secretions.  \par The laryngeal ventricles were without lesions.\par The visualized subglottis was without mass.\par The lateral and posterior pharyngeal walls were clear and symmetrical.\par The vocal folds were clear and mobile; they abducted and adducted normally.\par There was no interarytenoid mass or fullness.\par \par Endoscopy was done with Covid precautions and with video. All risks and benefits were discussed with the patient and consent obtained.\par She still has some mild posterior laryngeal inflammation but this certainly is stable

## 2022-04-22 NOTE — HISTORY OF PRESENT ILLNESS
[de-identified] : LOIS RANDALL was seen on April 22.  Since I had seen her she had fallen and broke her arm and leg.  She is in rehab but improving.  She comes in because she feels her reflux is worse especially since the diet at the rehab is not what she feels is appropriate for her reflux.  She also has the hearing losses and uses her hearing aids which were cleaned in March.  She is complaining of some nasal congestion and possible seasonal allergies.  The patient had no other ear nose or throat complaints at this visit.

## 2022-04-22 NOTE — ASSESSMENT
[FreeTextEntry1] : It was my impression that she has a rhinitis.  I could not say whether this is allergic but she finds that Flonase once a day, 1 puff in each side helps and I recommended continuing to do so.\par She had cerumen impactions that were cleared bilaterally and I suggested having her hearing rechecked and her hearing aids adjusted if needed\par She has the sensorineural hearing losses and has been doing well with her aids.\par She has reflux that appears relatively controlled in spite of her change in diet.  Hopefully she will be able to go home soon and go back to her regular alimentation.\par Otherwise, I would suggest repeat evaluation in 6 months or as needed.

## 2022-07-25 ENCOUNTER — APPOINTMENT (OUTPATIENT)
Dept: OTOLARYNGOLOGY | Facility: CLINIC | Age: 87
End: 2022-07-25

## 2022-08-31 ENCOUNTER — APPOINTMENT (OUTPATIENT)
Dept: OTOLARYNGOLOGY | Facility: CLINIC | Age: 87
End: 2022-08-31

## 2022-08-31 VITALS — BODY MASS INDEX: 24.19 KG/M2 | WEIGHT: 120 LBS | TEMPERATURE: 97.8 F | HEIGHT: 59 IN

## 2022-08-31 PROCEDURE — 69210 REMOVE IMPACTED EAR WAX UNI: CPT

## 2022-08-31 PROCEDURE — 31231 NASAL ENDOSCOPY DX: CPT

## 2022-08-31 PROCEDURE — 99214 OFFICE O/P EST MOD 30 MIN: CPT | Mod: 25

## 2022-08-31 NOTE — HISTORY OF PRESENT ILLNESS
[de-identified] : LOIS Larsen Was seen on August 31.  I had last seen her in April.  She is home from rehab.  She notes that she has a lot of pain in her back.  She feels that her reflux is either not as bad or just not bothering her as much.  She notes that she has the nasal congestion.  She finds that fluticasone helps but she is reluctant to use this.  She has a hearing loss and uses hearing aids satisfactorily.  The patient had no other ear nose or throat complaints at this visit.

## 2022-08-31 NOTE — PHYSICAL EXAM
[FreeTextEntry1] : General:\par The patient was alert and oriented and in no distress.\par Voice was clear..  She was using a walker for ambulation and looked a bit more frail.\par \par Ears:\par Procedure Note\par Removal of Cerumen- right ear   cpt 20275\par Dx:  Symptomatic cerument impaction- right ear\par Both external ears were normal.\par There was a dense cerumen impaction in the right ear.  This was cleared microscopically using suction and curettes without trauma.  Beyond that, both ear canals were clear both eardrums were intact and mobile.  The ear canals were quite dry\par \par Oral cavity:\par The oral mucosa was normal.\par The oral and base of tongue were clear and without mass.\par The gingival and buccal mucosa were moist and without lesions.\par The palate moved well.\par There was no cleft to the palate.\par There appeared to be good salivary flow.  \par There was no pus, erythema or mass in the oral cavity.\par \par Neck: \par The neck was symmetrical.\par The parotid and submandibular glands were normal without masses.\par The trachea was midline and there was no unusual crepitus.\par The thyroid was smooth and nontender and no masses were palpated.\par There was no significant cervical adenopathy.\par \par Nasal endoscopy: \par CPT 40317\par Procedure Note:\par \par Endoscopy was done with Covid precautions and with video. All risks and benefits were discussed with the patient and consent obtained.\par \par Nasal endoscopy was done with topical anesthesia of Pontocaine and Afrin and a      nasal endoscope.\par Indication: Nasal congestion, rule out sinusitis.\par Procedure: The nasal cavity was anesthetized with topical Afrin and Pontocaine. An  endoscope was used and inserted into the nasal cavity.\par Attention was first paid to the anterior nasal cavity.\par Endocoscopy was performed to inspect the interior of the nasal cavity, the nasal septum,  the middle and superior meati, the inferior, middle and superior turbinates, and the spheno-ethmoidal  recesses, the nasopharynx and eustachian tube orifices bilaterally. \par All findings were normal except:\par The nasal mucosa was boggy with an S-shaped deflection.  She had significant pinching of her nasal valves and a positive Morrison maneuver\par \par Flexible fiberoptic laryngoscopy: CPT 96715\par Indications: Dysphagia\par Procedure note:\par \par Flexible fiberoptic laryngoscopy was performed because of dysphagia and because of the patient's inability to tolerate adequate mirror examination.\par \par The nasal cavity was anesthetized with Pontocaine and Afrin.\par The flexible endoscope was placed into the patient's nasal cavity.\par The nasopharynx was without masses.\par The oropharynx, vallecula and base of tongue had no masses.\par The epiglottis, aryepiglottic folds and false vocal cords were normal.\par The pyriform sinuses were without mucosal lesions or pooling of secretions.  \par The laryngeal ventricles were without lesions.\par The visualized subglottis was without mass.\par The lateral and posterior pharyngeal walls were clear and symmetrical.\par The vocal folds were clear and mobile; they abducted and adducted normally.\par There was no interarytenoid mass or fullness.\par \par Endoscopy was done with Covid precautions and with video. All risks and benefits were discussed with the patient and consent obtained.\par The posterior laryngeal inflammation looked better

## 2022-08-31 NOTE — CONSULT LETTER
[FreeTextEntry2] : LM MERINO\par  [FreeTextEntry1] : \par \par Dear  Dr. LM MERINO,\par \par I had the pleasure of seeing your patient today.  \par Please see my note below.\par \par \par Thank you very much for allowing me to participate in the care of your patient.\par \par Sincerely,\par \par \par Russel Cavazos\par \par \par Evan Cavazos MD\par [facility]\par [facility]\par  [facility]\par \par  [FreeTextEntry3] : Russel\par \par Evan Cavazos MD\par [facility]\par [facility]\par  [facility]\par \par

## 2022-08-31 NOTE — ASSESSMENT
[FreeTextEntry1] : It is my impression that the patient has an an exczematoid otitis externa.  The pathogenesis was discussed.  I suggested avoiding Q-tips.  I recommended topical moisturizing and using either Dermotic drops or other oil drops.  I suggested repeat evaluation in 6 months or earlier should the need arise.\par She has a rhinitis that is partially mucosal congestion and I felt she could use the fluticasone safely.  She may want to use it 1 puff once a day and I thought that would be fine.\par Some of her congestion is from her nasal valve and we discussed Vivaer but I did not so recommend.\par She has the hearing losses and I recommended following up with her hearing aids\par Otherwise, I recommended repeat evaluation in 6 months or as needed.\par \par

## 2022-12-08 ENCOUNTER — APPOINTMENT (OUTPATIENT)
Dept: OTOLARYNGOLOGY | Facility: CLINIC | Age: 87
End: 2022-12-08

## 2022-12-08 VITALS — BODY MASS INDEX: 24.19 KG/M2 | WEIGHT: 120 LBS | TEMPERATURE: 97.4 F | HEIGHT: 59 IN

## 2022-12-08 DIAGNOSIS — R42 DIZZINESS AND GIDDINESS: ICD-10-CM

## 2022-12-08 PROCEDURE — 31231 NASAL ENDOSCOPY DX: CPT

## 2022-12-08 PROCEDURE — 99214 OFFICE O/P EST MOD 30 MIN: CPT | Mod: 25

## 2022-12-08 RX ORDER — AZITHROMYCIN 250 MG/1
250 TABLET, FILM COATED ORAL
Qty: 1 | Refills: 1 | Status: ACTIVE | COMMUNITY
Start: 2022-12-08 | End: 1900-01-01

## 2022-12-14 LAB — EAR NOSE AND THROAT CULTURE: ABNORMAL

## 2022-12-14 RX ORDER — CEFUROXIME AXETIL 250 MG/1
250 TABLET ORAL
Qty: 20 | Refills: 1 | Status: ACTIVE | COMMUNITY
Start: 2022-12-14 | End: 1900-01-01

## 2022-12-14 NOTE — CONSULT LETTER
[FreeTextEntry2] : LM MERINO\par  [FreeTextEntry1] : \par \par Dear  Dr. LM MERINO,\par \par I had the pleasure of seeing your patient today.  \par Please see my note below.\par \par \par Thank you very much for allowing me to participate in the care of your patient.\par \par Sincerely,\par \par \par Russel Cavazos\par \par \par Evan Cavazos MD\par [facility]\par [facility]\par  [facility]\par \par  [FreeTextEntry3] : Russel\par \par Evan Cavazos MD\par [facility]\par [facility]\par  [facility]\par \par

## 2022-12-14 NOTE — ADDENDUM
[FreeTextEntry1] : 12/11/22   MSSA, but erythro resistant and on azithromycin-  will ask lab to check \par \par 12/13-  resistant to azithromycin-  if not feeling better will switch to cefuroxime.  RLP

## 2022-12-14 NOTE — PHYSICAL EXAM
[FreeTextEntry1] : General:\par The patient was alert and oriented and in no distress.\par Voice was clear.  She comes in with an aide and is using a walker for ambulation.  She has binaural amplification\par \par Ears:\par The external ears were normal without deformity.\par The ear canals were clear.\par The tympanic membranes were intact and normal.\par \par Oral cavity:\par The oral mucosa was normal.\par The oral and base of tongue were clear and without mass.\par The gingival and buccal mucosa were moist and without lesions.\par The palate moved well.\par There was no cleft to the palate.\par There appeared to be good salivary flow.  \par There was no pus, erythema or mass in the oral cavity.\par \par \par Neck: \par The neck was symmetrical.\par The parotid and submandibular glands were normal without masses.\par The trachea was midline and there was no unusual crepitus.\par The thyroid was smooth and nontender and no masses were palpated.\par There was no significant cervical adenopathy.\par \par Nasal endoscopy: \par CPT 08815\par Procedure Note:\par \par Endoscopy was done with Covid precautions and with video. All risks and benefits were discussed with the patient and consent obtained.\par \par Nasal endoscopy was done with topical anesthesia of Pontocaine and Afrin and a      nasal endoscope.\par Indication: Nasal congestion, rule out sinusitis.\par Procedure: The nasal cavity was anesthetized with topical Afrin and Pontocaine. An  endoscope was used and inserted into the nasal cavity.\par Attention was first paid to the anterior nasal cavity.\par Endocoscopy was performed to inspect the interior of the nasal cavity, the nasal septum,  the middle and superior meati, the inferior, middle and superior turbinates, and the spheno-ethmoidal  recesses, the nasopharynx and eustachian tube orifices bilaterally. \par All findings were normal except:\par \par The nasal mucosa is quite boggy and congested with a moderate S-shaped deflection.  There is mucopus in the right anterior middle meatus and nasal cavity.\par Switching to a rigid 0 degree endoscope this was cultured

## 2022-12-14 NOTE — HISTORY OF PRESENT ILLNESS
[de-identified] : LOIS RANDALL is a 89 year old female who comes in complaining of increased nasal congestion and sneezing and a watery nasal discharge worse on the right than on the left.  Her throat is not been bothering her quite as much.  She does have more problems with her balance and feels dizzy when she is upright.  The patient had no other ear nose or throat complaints at this visit.

## 2022-12-14 NOTE — ASSESSMENT
[FreeTextEntry1] : It was my impression that she has a long history of throat pain and probable reflux which she is not finding that bothersome at this juncture.  She has a sinusitis on the right.  Pending the culture results I suggested a course of Zithromax.  I explained that sometimes this can be resistant to Zithromax and I would change antibiotics if so indicated.  I had recommended going back to Flonase which she stopped because she thought that perhaps was causing her sneezing.  She had taken 2 days of acyclovir previously and said that helped but it did not suggest doing more of that at this point.\par I would like to reevaluate in 3 weeks to make sure the infection has responded.\par \par I again suggested balance and gait therapy and gave her a recommendation.

## 2023-01-04 ENCOUNTER — APPOINTMENT (OUTPATIENT)
Dept: OTOLARYNGOLOGY | Facility: CLINIC | Age: 88
End: 2023-01-04
Payer: MEDICARE

## 2023-01-04 VITALS — TEMPERATURE: 95.9 F | HEIGHT: 59 IN | BODY MASS INDEX: 24.19 KG/M2 | WEIGHT: 120 LBS

## 2023-01-04 DIAGNOSIS — J32.9 CHRONIC SINUSITIS, UNSPECIFIED: ICD-10-CM

## 2023-01-04 DIAGNOSIS — R13.10 DYSPHAGIA, UNSPECIFIED: ICD-10-CM

## 2023-01-04 PROCEDURE — 31231 NASAL ENDOSCOPY DX: CPT

## 2023-01-04 PROCEDURE — 99214 OFFICE O/P EST MOD 30 MIN: CPT | Mod: 25

## 2023-01-04 NOTE — HISTORY OF PRESENT ILLNESS
[de-identified] : LOIS RANDALL was not seen in follow-up on January 4.  She notes her nasal congestion and symptoms improved well after taking cefuroxime.  She stopped the fluticasone as well.  She still is complaining of a sore throat and hoarseness.  She has a long history of reflux and feels this is exacerbated.  Her previous culture with methicillin sensitive staph that was resistant, however to azithromycin.  The patient had no other ear nose or throat complaints at this visit.

## 2023-01-04 NOTE — CONSULT LETTER
[FreeTextEntry2] : LM MERINO\par  [FreeTextEntry1] : \par \par Dear  Dr. LM MERINO,\par \par I had the pleasure of seeing your patient today.  \par Please see my note below.\par \par \par Thank you very much for allowing me to participate in the care of your patient.\par \par Sincerely,\par \par \par Evan Cavazos MD\par [facility]\par [facility]\par  [facility]\par \par

## 2023-01-04 NOTE — PHYSICAL EXAM
[FreeTextEntry1] : \par The patient was alert and oriented and in no distress.\par Voice was clear.\par \par Face:\par The patient had no facial asymmetry or mass.\par The skin was unremarkable.\par \par Eyes:\par The pupils were equal round and reactive to light and accommodation.\par There was no significant nystagmus or disconjugate gaze noted.\par \par Nose: \par The external nose had no significant deformity.  There was no facial tenderness.  On anterior rhinoscopy, the nasal mucosa was clear.  The anterior septum was midline.  There were no visualized polyps purulence  or masses.\par \par Oral cavity:\par The oral mucosa was normal.\par The oral and base of tongue were clear and without mass.\par The gingival and buccal mucosa were moist and without lesions.\par The palate moved well.\par There was no cleft to the palate.\par There appeared to be good salivary flow.  \par There was no pus, erythema or mass in the oral cavity.\par \par \par Ears:\par The external ears were normal without deformity.\par The ear canals were clear.\par The tympanic membranes were intact and normal.\par \par Neck: \par The neck was symmetrical.\par The parotid and submandibular glands were normal without masses.\par The trachea was midline and there was no unusual crepitus.\par The thyroid was smooth and nontender and no masses were palpated.\par There was no significant cervical adenopathy.\par \par \par Neuro:\par Neurologically, the patient was awake, alert, and oriented to person, place and time. There were no obvious focal neurologic abnormalities.  Cranial nerves II through XII were grossly intact.\par \par \par TMJ:\par The temporomandibular joints were nontender.\par There was no abnormal crepitus and no significant malocclusion\par  [de-identified] : Nasal endoscopy: \par CPT 20315\par Procedure Note:\par \par Endoscopy was done with Covid precautions and with video. All risks and benefits were discussed with the patient and consent obtained.\par \par Nasal endoscopy was done with topical anesthesia of Pontocaine and Afrin and a      nasal endoscope.\par Indication: Nasal congestion, rule out sinusitis.\par Procedure: The nasal cavity was anesthetized with topical Afrin and Pontocaine. An  endoscope was used and inserted into the nasal cavity.\par Attention was first paid to the anterior nasal cavity.\par Endocoscopy was performed to inspect the interior of the nasal cavity, the nasal septum,  the middle and superior meati, the inferior, middle and superior turbinates, and the spheno-ethmoidal  recesses, the nasopharynx and eustachian tube orifices bilaterally. \par All findings were normal except:\par She has a right septal deflection and the mucosa on the right side is beefy and inflamed but the purulence has resolved and there were no polyps or masses.\par \par Flexible fiberoptic laryngoscopy: CPT 09811\par Indications: Dysphagia\par Procedure note:\par \par Flexible fiberoptic laryngoscopy was performed because of dysphagia and because of the patient's inability to tolerate adequate mirror examination.\par \par The nasal cavity was anesthetized with Pontocaine and Afrin.\par The flexible endoscope was placed into the patient's nasal cavity.\par The nasopharynx was without masses.\par The oropharynx, vallecula and base of tongue had no masses.\par The epiglottis, aryepiglottic folds and false vocal cords were normal.\par The pyriform sinuses were without mucosal lesions or pooling of secretions.  \par The laryngeal ventricles were without lesions.\par The visualized subglottis was without mass.\par The lateral and posterior pharyngeal walls were clear and symmetrical.\par The vocal folds were clear and mobile; they abducted and adducted normally.\par There was no interarytenoid mass or fullness.\par \par Endoscopy was done with Covid precautions and with video. All risks and benefits were discussed with the patient and consent obtained.\par There remains some moderate posterior laryngeal inflammation

## 2023-01-04 NOTE — ASSESSMENT
[FreeTextEntry1] : It was my impression that her purulent sinusitis had responded.  She has a rhinitis and dry nasal mucosa and I suggested a humidifier and topical moisturizing for the heating season.  Her sore throat probably still is some reflux and she has stopped medication.  I reviewed an antireflux diet with her and suggested going back to Pepcid at bedtime and would like to reevaluate in 3 months or as needed.

## 2023-01-18 ENCOUNTER — NON-APPOINTMENT (OUTPATIENT)
Age: 88
End: 2023-01-18

## 2023-01-23 RX ORDER — FLUTICASONE PROPIONATE 50 UG/1
50 SPRAY, METERED NASAL
Qty: 1 | Refills: 4 | Status: ACTIVE | COMMUNITY
Start: 2023-01-23 | End: 1900-01-01

## 2023-02-28 ENCOUNTER — APPOINTMENT (OUTPATIENT)
Dept: OTOLARYNGOLOGY | Facility: CLINIC | Age: 88
End: 2023-02-28

## 2023-03-14 ENCOUNTER — APPOINTMENT (OUTPATIENT)
Dept: OTOLARYNGOLOGY | Facility: CLINIC | Age: 88
End: 2023-03-14
Payer: MEDICARE

## 2023-03-14 PROCEDURE — 99213 OFFICE O/P EST LOW 20 MIN: CPT | Mod: 25

## 2023-03-14 PROCEDURE — 31231 NASAL ENDOSCOPY DX: CPT

## 2023-03-14 NOTE — ASSESSMENT
[FreeTextEntry1] : It was my impression that this is likely viral infection.  I reviewed the findings with her and could not explain why it was worse on the left than on the right.  However I recommended supportive care and asked her to call me if she has not resolved in the next few days.  I also will want to recheck your tongue to make sure this was just traumatic.

## 2023-03-14 NOTE — PHYSICAL EXAM
[FreeTextEntry1] : General:\par The patient was alert and oriented and in no distress.\par Voice was clear.  She is using a walker and comes in with an aide and looks much more frail and sounds congested.\par \par Ears:\par The external ears were normal without deformity.\par The ear canals were clear.\par The tympanic membranes were intact and normal.\par \par Oral cavity:\par The oral mucosa was normal.\par The oral and base of tongue were clear and without mass.\par The gingival and buccal mucosa were moist and without lesions.\par The palate moved well.\par There was no cleft to the palate.\par There appeared to be good salivary flow.  \par There was no pus, erythema or mass in the oral cavity.\par \par She had a 1 cm area of fullness of the right mid lateral tongue with an area of fissure\par \par Neuro:\par Neurologically, the patient was awake, alert, and oriented to person, place and time. There were no obvious focal neurologic abnormalities.  Cranial nerves II through XII were grossly intact.\par \par Neck: \par The neck was symmetrical.\par The parotid and submandibular glands were normal without masses.\par The trachea was midline and there was no unusual crepitus.\par The thyroid was smooth and nontender and no masses were palpated.\par There was no significant cervical adenopathy. [de-identified] : Nasal endoscopy: \par CPT 97608\par Procedure Note:\par \par Endoscopy was done with Covid precautions and with video. All risks and benefits were discussed with the patient and consent obtained.\par \par Nasal endoscopy was done with topical anesthesia of Pontocaine and Afrin and a      nasal endoscope.\par Indication: Nasal congestion, rule out sinusitis.\par Procedure: The nasal cavity was anesthetized with topical Afrin and Pontocaine. An  endoscope was used and inserted into the nasal cavity.\par Attention was first paid to the anterior nasal cavity.\par Endocoscopy was performed to inspect the interior of the nasal cavity, the nasal septum,  the middle and superior meati, the inferior, middle and superior turbinates, and the spheno-ethmoidal  recesses, the nasopharynx and eustachian tube orifices bilaterally. including the nasal mocosa, the possibility of polyps and the consistency of the nasal mucous.\par All findings were normal except:\par The nasal mucosa was boggy and congested, worse on the left, however both middle meati were patent and there was no recurrence of infection.

## 2023-03-14 NOTE — HISTORY OF PRESENT ILLNESS
[de-identified] : LOIS RANDALL was seen on March 14.  She comes in with 4 days of increasing left-sided nasal congestion with a little bit of bloody discharge.  She stopped the nasal steroid sprays because of the potential side effects.  She has 3 days of a swelling on her tongue and her left eye feels blocked.  She is concerned about a sinus infection the patient had no other ear nose or throat complaints at this visit.

## 2023-03-14 NOTE — CONSULT LETTER
[FreeTextEntry2] : LM MERINO\par  [FreeTextEntry1] : \par \par Dear  Dr. LM MERINO,\par \par I had the pleasure of seeing your patient today.  \par Please see my note below.\par \par \par Thank you very much for allowing me to participate in the care of your patient.\par \par Sincerely,\par \par \par Evan Cavazos MD\par [facility]\par [facility]\par  [facility]\par \par

## 2023-08-14 ENCOUNTER — APPOINTMENT (OUTPATIENT)
Dept: OTOLARYNGOLOGY | Facility: CLINIC | Age: 88
End: 2023-08-14
Payer: SELF-PAY

## 2023-08-14 DIAGNOSIS — Z46.1 ENCOUNTER FOR FITTING AND ADJUSTMENT OF HEARING AID: ICD-10-CM

## 2023-08-14 PROCEDURE — 92593: CPT | Mod: NC

## 2023-12-05 ENCOUNTER — APPOINTMENT (OUTPATIENT)
Dept: OTOLARYNGOLOGY | Facility: CLINIC | Age: 88
End: 2023-12-05
Payer: MEDICARE

## 2023-12-05 DIAGNOSIS — R04.0 EPISTAXIS: ICD-10-CM

## 2023-12-05 PROCEDURE — 99214 OFFICE O/P EST MOD 30 MIN: CPT | Mod: 25

## 2023-12-05 PROCEDURE — 31231 NASAL ENDOSCOPY DX: CPT

## 2024-05-08 ENCOUNTER — APPOINTMENT (OUTPATIENT)
Dept: OTOLARYNGOLOGY | Facility: CLINIC | Age: 89
End: 2024-05-08
Payer: MEDICARE

## 2024-05-08 ENCOUNTER — APPOINTMENT (OUTPATIENT)
Dept: OTOLARYNGOLOGY | Facility: CLINIC | Age: 89
End: 2024-05-08
Payer: SELF-PAY

## 2024-05-08 DIAGNOSIS — J34.2 DEVIATED NASAL SEPTUM: ICD-10-CM

## 2024-05-08 DIAGNOSIS — J31.0 CHRONIC RHINITIS: ICD-10-CM

## 2024-05-08 DIAGNOSIS — H60.8X3 OTHER OTITIS EXTERNA, BILATERAL: ICD-10-CM

## 2024-05-08 DIAGNOSIS — H61.23 IMPACTED CERUMEN, BILATERAL: ICD-10-CM

## 2024-05-08 DIAGNOSIS — K21.9 GASTRO-ESOPHAGEAL REFLUX DISEASE W/OUT ESOPHAGITIS: ICD-10-CM

## 2024-05-08 DIAGNOSIS — H90.5 UNSPECIFIED SENSORINEURAL HEARING LOSS: ICD-10-CM

## 2024-05-08 DIAGNOSIS — J34.89 OTHER SPECIFIED DISORDERS OF NOSE AND NASAL SINUSES: ICD-10-CM

## 2024-05-08 DIAGNOSIS — H90.3 SENSORINEURAL HEARING LOSS, BILATERAL: ICD-10-CM

## 2024-05-08 PROCEDURE — 99214 OFFICE O/P EST MOD 30 MIN: CPT | Mod: 25

## 2024-05-08 PROCEDURE — 92593: CPT | Mod: NC

## 2024-05-08 PROCEDURE — 92567 TYMPANOMETRY: CPT

## 2024-05-08 PROCEDURE — 92557 COMPREHENSIVE HEARING TEST: CPT

## 2024-05-08 PROCEDURE — G0268 REMOVAL OF IMPACTED WAX MD: CPT

## 2024-05-08 RX ORDER — FLUTICASONE PROPIONATE 50 UG/1
50 SPRAY, METERED NASAL
Qty: 6 | Refills: 3 | Status: ACTIVE | COMMUNITY
Start: 2023-12-05 | End: 1900-01-01

## 2024-05-08 RX ORDER — IPRATROPIUM BROMIDE 21 UG/1
0.03 SPRAY NASAL 3 TIMES DAILY
Qty: 3 | Refills: 3 | Status: ACTIVE | COMMUNITY
Start: 2023-12-05 | End: 1900-01-01

## 2024-05-08 NOTE — PHYSICAL EXAM
[FreeTextEntry1] : General: The patient was alert and oriented and in no distress. Voice was clear.  She comes in with an aide is using a walker and looks more frail.  Ears:  Procedure note:  Removal of Cerumen Impactions, both ears:  [id number] Both external ears were normal. There were symptomatic cerumen impactions in both ears.  These were cleared microscopically without trauma using both suction and curettes.  After clearing,  both ear canals were clear both eardrums were intact and mobile.    verbal consent was obtained and patient felt improvement after procedure  Oral cavity: The oral mucosa was normal. The oral and base of tongue were clear and without mass. The gingival and buccal mucosa were moist and without lesions. The palate moved well. There was no cleft to the palate. There appeared to be good salivary flow.   There was no pus, erythema or mass in the oral cavity.  Neck:  The neck was symmetrical. The parotid and submandibular glands were normal without masses. The trachea was midline and there was no unusual crepitus. The thyroid was smooth and nontender and no masses were palpated. There was no significant cervical adenopathy.  Nose:  The external nose had no significant deformity.  There was no facial tenderness.  On anterior rhinoscopy, the nasal mucosa was clear.  The anterior septum was midline.  There were no visualized polyps purulence  or masses. The nasal mucosa is flat with the S-shaped deflection but no polyps purulence or masses

## 2024-05-08 NOTE — REASON FOR VISIT
[Subsequent Evaluation] : a subsequent evaluation for [Hearing Loss] : hearing loss [Nasal Septum (Deviated)] : deviated nasal septum [Nasal Obstruction] : nasal obstruction [Gastroesophageal Reflux] : gastroesophageal reflux

## 2024-05-08 NOTE — CONSULT LETTER
[FreeTextEntry2] : LM MERINO [FreeTextEntry1] :   Dear  Dr. LM MERINO,  I had the pleasure of seeing your patient today.   Please see my note below.   Thank you very much for allowing me to participate in the care of your patient.  Sincerely,  Evan Cavazos MD [facility]  [facility]

## 2024-05-08 NOTE — HISTORY OF PRESENT ILLNESS
[de-identified] : LOIS RANDALL was seen on May 8.  She comes in with complaints of cerumen impactions.  She has a rhinitis but she says the fluticasone and ipratropium are helping her.  She has a history of a septal deflection chronic sinusitis and reflux.  She has the hearing losses and uses hearing aids satisfactorily.  She comes in for repeat evaluation.

## 2024-05-08 NOTE — ASSESSMENT
[FreeTextEntry1] : It was my impression that she has the sensorineural hearing losses.  Her audiogram was done and reviewed with the patient and showed little change from her previous.  I recommended having her hearing aids adjusted as needed.  It is my impression that the patient has an an exczematoid otitis externa.  The pathogenesis was discussed.  I suggested avoiding Q-tips.  I recommended topical moisturizing and using either Dermotic drops or other oil drops.  I suggested repeat evaluation in 6 months or earlier should the need arise. She has a rhinitis and that she finds that the ipratropium and fluticasone are helpful and I recommend continuing. She has a history of reflux.

## 2024-05-14 PROBLEM — H90.3 SENSORINEURAL HEARING LOSS (SNHL) OF BOTH EARS: Status: ACTIVE | Noted: 2019-07-10

## 2024-07-08 ENCOUNTER — NEW PATIENT (OUTPATIENT)
Dept: URBAN - METROPOLITAN AREA CLINIC 33 | Facility: CLINIC | Age: 89
End: 2024-07-08

## 2024-07-08 DIAGNOSIS — H35.3231: ICD-10-CM

## 2024-07-08 PROCEDURE — 92202 OPSCPY EXTND ON/MAC DRAW: CPT | Mod: 59

## 2024-07-08 PROCEDURE — 92134 CPTRZ OPH DX IMG PST SGM RTA: CPT

## 2024-07-08 PROCEDURE — 92004 COMPRE OPH EXAM NEW PT 1/>: CPT | Mod: 25

## 2024-07-08 PROCEDURE — 92250 FUNDUS PHOTOGRAPHY W/I&R: CPT | Mod: NC

## 2024-07-08 PROCEDURE — 67028 INJECTION EYE DRUG: CPT

## 2024-07-08 ASSESSMENT — TONOMETRY
OD_IOP_MMHG: 14
OS_IOP_MMHG: 14

## 2024-07-08 ASSESSMENT — VISUAL ACUITY: OD_CC: 20/30-2

## 2024-08-05 ENCOUNTER — APPOINTMENT (RX ONLY)
Dept: URBAN - METROPOLITAN AREA CLINIC 37 | Facility: CLINIC | Age: 89
Setting detail: DERMATOLOGY
End: 2024-08-05

## 2024-08-05 DIAGNOSIS — L89 PRESSURE ULCER: ICD-10-CM

## 2024-08-05 PROBLEM — L89.329 PRESSURE ULCER OF LEFT BUTTOCK, UNSPECIFIED STAGE: Status: ACTIVE | Noted: 2024-08-05

## 2024-08-05 PROCEDURE — ? COUNSELING

## 2024-08-05 PROCEDURE — ? TREATMENT REGIMEN

## 2024-08-05 PROCEDURE — ? PRESCRIPTION

## 2024-08-05 PROCEDURE — 99203 OFFICE O/P NEW LOW 30 MIN: CPT

## 2024-08-05 RX ORDER — MUPIROCIN 20 MG/G
OINTMENT TOPICAL
Qty: 22 | Refills: 0 | Status: ERX | COMMUNITY
Start: 2024-08-05

## 2024-08-05 RX ADMIN — MUPIROCIN: 20 OINTMENT TOPICAL at 00:00

## 2024-08-05 ASSESSMENT — LOCATION ZONE DERM: LOCATION ZONE: TRUNK

## 2024-08-05 ASSESSMENT — LOCATION DETAILED DESCRIPTION DERM: LOCATION DETAILED: LEFT BUTTOCK

## 2024-08-05 ASSESSMENT — LOCATION SIMPLE DESCRIPTION DERM: LOCATION SIMPLE: LEFT BUTTOCK

## 2024-08-05 NOTE — HPI: RASH
What Type Of Note Output Would You Prefer (Optional)?: Standard Output
How Severe Is Your Rash?: moderate
Is This A New Presentation, Or A Follow-Up?: Rash
Additional History: Pt states she has a rash she would like to get checked

## 2024-08-05 NOTE — PROCEDURE: TREATMENT REGIMEN
"Circumcision      Date/Time: 
Detail Level: Zone
Plan: See wound care

## 2024-08-26 ENCOUNTER — FOLLOW UP (OUTPATIENT)
Dept: URBAN - METROPOLITAN AREA CLINIC 33 | Facility: CLINIC | Age: 89
End: 2024-08-26

## 2024-08-26 DIAGNOSIS — H35.3231: ICD-10-CM

## 2024-08-26 PROCEDURE — 92134 CPTRZ OPH DX IMG PST SGM RTA: CPT

## 2024-08-26 PROCEDURE — 67028 INJECTION EYE DRUG: CPT

## 2024-08-26 PROCEDURE — 92014 COMPRE OPH EXAM EST PT 1/>: CPT | Mod: 25

## 2024-08-26 PROCEDURE — 92202 OPSCPY EXTND ON/MAC DRAW: CPT | Mod: 59,LT

## 2024-08-26 ASSESSMENT — VISUAL ACUITY: OD_CC: 20/30-1

## 2024-08-26 ASSESSMENT — TONOMETRY
OS_IOP_MMHG: 13
OD_IOP_MMHG: 11

## 2024-11-11 ENCOUNTER — FOLLOW UP (OUTPATIENT)
Dept: URBAN - METROPOLITAN AREA CLINIC 33 | Facility: CLINIC | Age: 89
End: 2024-11-11

## 2024-11-11 DIAGNOSIS — H35.3231: ICD-10-CM

## 2024-11-11 PROCEDURE — 92014 COMPRE OPH EXAM EST PT 1/>: CPT | Mod: 25

## 2024-11-11 PROCEDURE — 92202 OPSCPY EXTND ON/MAC DRAW: CPT | Mod: 59

## 2024-11-11 PROCEDURE — 92134 CPTRZ OPH DX IMG PST SGM RTA: CPT

## 2024-11-11 PROCEDURE — 67028 INJECTION EYE DRUG: CPT

## 2024-11-11 ASSESSMENT — TONOMETRY
OD_IOP_MMHG: 13
OS_IOP_MMHG: 15

## 2024-11-11 ASSESSMENT — VISUAL ACUITY: OD_CC: 20/40+2

## 2025-02-03 ENCOUNTER — FOLLOW UP (OUTPATIENT)
Dept: URBAN - METROPOLITAN AREA CLINIC 33 | Facility: CLINIC | Age: OVER 89
End: 2025-02-03

## 2025-02-03 DIAGNOSIS — H35.3231: ICD-10-CM

## 2025-02-03 PROCEDURE — 67028 INJECTION EYE DRUG: CPT

## 2025-02-03 PROCEDURE — 92014 COMPRE OPH EXAM EST PT 1/>: CPT | Mod: 25

## 2025-02-03 PROCEDURE — 92134 CPTRZ OPH DX IMG PST SGM RTA: CPT

## 2025-02-03 PROCEDURE — 92202 OPSCPY EXTND ON/MAC DRAW: CPT | Mod: 59

## 2025-02-03 ASSESSMENT — TONOMETRY
OS_IOP_MMHG: 13
OD_IOP_MMHG: 14

## 2025-02-03 ASSESSMENT — VISUAL ACUITY: OD_CC: 20/30-1
